# Patient Record
Sex: FEMALE | Race: ASIAN
[De-identification: names, ages, dates, MRNs, and addresses within clinical notes are randomized per-mention and may not be internally consistent; named-entity substitution may affect disease eponyms.]

---

## 2024-02-06 PROBLEM — Z00.00 ENCOUNTER FOR PREVENTIVE HEALTH EXAMINATION: Status: ACTIVE | Noted: 2024-02-06

## 2024-02-08 ENCOUNTER — APPOINTMENT (OUTPATIENT)
Dept: PULMONOLOGY | Facility: CLINIC | Age: 69
End: 2024-02-08
Payer: MEDICARE

## 2024-02-08 VITALS — WEIGHT: 95 LBS | BODY MASS INDEX: 16.83 KG/M2 | HEIGHT: 63 IN

## 2024-02-08 DIAGNOSIS — J30.9 ALLERGIC RHINITIS, UNSPECIFIED: ICD-10-CM

## 2024-02-08 DIAGNOSIS — Z78.9 OTHER SPECIFIED HEALTH STATUS: ICD-10-CM

## 2024-02-08 DIAGNOSIS — F51.04 PSYCHOPHYSIOLOGIC INSOMNIA: ICD-10-CM

## 2024-02-08 DIAGNOSIS — Z87.891 PERSONAL HISTORY OF NICOTINE DEPENDENCE: ICD-10-CM

## 2024-02-08 DIAGNOSIS — R06.83 SNORING: ICD-10-CM

## 2024-02-08 PROCEDURE — 99203 OFFICE O/P NEW LOW 30 MIN: CPT

## 2024-02-08 RX ORDER — AMLODIPINE BESYLATE 5 MG/1
TABLET ORAL
Refills: 0 | Status: ACTIVE | COMMUNITY

## 2024-02-08 RX ORDER — ATORVASTATIN CALCIUM 80 MG/1
TABLET, FILM COATED ORAL
Refills: 0 | Status: ACTIVE | COMMUNITY

## 2024-02-08 RX ORDER — METFORMIN HYDROCHLORIDE 625 MG/1
TABLET ORAL
Refills: 0 | Status: ACTIVE | COMMUNITY

## 2024-02-08 NOTE — PHYSICAL EXAM
[Enlarged Base of the Tongue] : enlargement of the base of the tongue [IV] : IV [General Appearance - Well Developed] : well developed [General Appearance - Well Nourished] : well nourished [Heart Sounds] : normal S1 and S2 [Murmurs] : no murmurs [] : no respiratory distress [Auscultation Breath Sounds / Voice Sounds] : lungs were clear to auscultation bilaterally [No Focal Deficits] : no focal deficits [Oriented To Time, Place, And Person] : oriented to person, place, and time [Memory Recent] : recent memory was not impaired [FreeTextEntry1] : no edema

## 2024-02-08 NOTE — REVIEW OF SYSTEMS
[Difficulty Maintaining Sleep] : difficulty maintaining sleep [Chronic Insomnia] : chronic  insomnia [Negative] : Psychiatric

## 2024-02-08 NOTE — HISTORY OF PRESENT ILLNESS
[FreeTextEntry1] : Flores Srivastava 69 year year-old woman with history of diabetes, htn, hld is here in the sleep center to address insomnia.  Patient has chronic insomnia for 3 yrs.  She is given trazadone 100 mg, initially she started with 50 mg and now increased to 100 mg.  Over time her insomnia has gotten worse.  She does not feel refreshed but able to function. She gets about 6 hrs of sleep with trazadone.  Patient has some symptoms suggestive of sleep apnea.  There is loud snoring, no witnessed apneas. She wakes up with dry mouth.  Even with 6 to 7 hours of sleep she is tired during the daytime.  She is not sleepy while driving.  Stop bang score is 3 which is intermediate risk for sleep apnea.  Patient exercises earlier in the day.  She drinks 3-4 cups of coffee.  She eats dinner 5-6 at night and she is not on electronics closer to the bedtime.  She watches some TV before sleeping, turns off the tv to sleep.  She drinks one glass of wine a night.  She goes to bed 11 pm to midnight able to fall asleep easily without any medication, but cant stay asleep. Wakes up at 1-3 am and then is not able to sleep for the rest of the night, she is taking trazadone 100 mg with some benefit. She gets up around 7 am.

## 2024-12-25 PROBLEM — F10.90 ALCOHOL USE: Status: ACTIVE | Noted: 2024-02-08
